# Patient Record
Sex: MALE | Race: BLACK OR AFRICAN AMERICAN | Employment: FULL TIME | URBAN - METROPOLITAN AREA
[De-identification: names, ages, dates, MRNs, and addresses within clinical notes are randomized per-mention and may not be internally consistent; named-entity substitution may affect disease eponyms.]

---

## 2024-10-10 ENCOUNTER — OFFICE VISIT (OUTPATIENT)
Age: 19
End: 2024-10-10

## 2024-10-10 VITALS — HEIGHT: 67 IN | BODY MASS INDEX: 26.68 KG/M2 | WEIGHT: 170 LBS

## 2024-10-10 DIAGNOSIS — M25.512 CHRONIC LEFT SHOULDER PAIN: Primary | ICD-10-CM

## 2024-10-10 DIAGNOSIS — M19.012 ARTHRITIS OF LEFT SHOULDER REGION: ICD-10-CM

## 2024-10-10 DIAGNOSIS — G89.29 CHRONIC LEFT SHOULDER PAIN: Primary | ICD-10-CM

## 2024-10-10 PROCEDURE — 99202 OFFICE O/P NEW SF 15 MIN: CPT | Performed by: ORTHOPAEDIC SURGERY

## 2024-10-10 PROCEDURE — 73030 X-RAY EXAM OF SHOULDER: CPT | Performed by: ORTHOPAEDIC SURGERY

## 2024-10-10 SDOH — HEALTH STABILITY: PHYSICAL HEALTH: ON AVERAGE, HOW MANY DAYS PER WEEK DO YOU ENGAGE IN MODERATE TO STRENUOUS EXERCISE (LIKE A BRISK WALK)?: 0 DAYS

## 2024-10-10 NOTE — PROGRESS NOTES
Joyce Rees  2005   Chief Complaint   Patient presents with    Shoulder Pain     left        HISTORY OF PRESENT ILLNESS  Joyce Rees is a 19 y.o. male who presents today for evaluation of left shoulder pain.  Pain is a 9/10. Pain has been present for a year. He had undergone surgery on his shoulder over a year ago in [place].  Continues to have problems.    Has tried following treatments: Injections:No; Brace:No; Therapy:No; Cane/Crutch:No      No Known Allergies     History reviewed. No pertinent past medical history.   Social History    None        History reviewed. No pertinent surgical history.   History reviewed. No pertinent family history.  No current outpatient medications on file.     No current facility-administered medications for this visit.       REVIEW OF SYSTEM   Patient denies: Weight loss, Fever/Chills, HA, Visual changes, Fatigue, Chest pain, SOB, Abdominal pain, N/V/D/C, Blood in stool or urine, Edema.   Pertinent positive as above in HPI. All others were negative    PHYSICAL EXAM:   Ht 1.702 m (5' 7\")   Wt 77.1 kg (170 lb)   BMI 26.63 kg/m²   The patient is a well-developed, well-nourished male   in no acute distress.  The patient is alert and oriented times three.  The patient is alert and oriented times three. Mood and affect are normal.  LYMPHATIC: lymph nodes are not enlarged and are within normal limits  SKIN: normal in color and non tender to palpation. There are no bruises or abrasions noted.   NEUROLOGICAL: Motor sensory exam is within normal limits. Reflexes are equal bilaterally. There is normal sensation to pinprick and light touch  MUSCULOSKELETAL: Restricted range of motion of the left shoulder         PROCEDURE: none    IMAGING: XR of the left shoulder with 3 views obtained in office on 10/10/2024 reviewed and read by : 2 screws that appear to have eroded into the joint from a previous bone stabilization procedure Laterge

## 2024-11-05 ENCOUNTER — CLINICAL DOCUMENTATION (OUTPATIENT)
Age: 19
End: 2024-11-05

## 2024-11-05 NOTE — PROGRESS NOTES
Referral , insurance card, patient demographics , and last office note faxed to [facility] for scheduling. [facility] P:[phone]   F:[phone].   Patient prefers an office in the [facility] instead of [facility]
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
## 2025-07-01 ENCOUNTER — OFFICE VISIT (OUTPATIENT)
Age: 20
End: 2025-07-01
Payer: MEDICAID

## 2025-07-01 DIAGNOSIS — G89.29 CHRONIC LEFT SHOULDER PAIN: Primary | ICD-10-CM

## 2025-07-01 DIAGNOSIS — M19.012 ARTHRITIS OF LEFT SHOULDER REGION: ICD-10-CM

## 2025-07-01 DIAGNOSIS — M25.512 CHRONIC LEFT SHOULDER PAIN: Primary | ICD-10-CM

## 2025-07-01 PROCEDURE — 99213 OFFICE O/P EST LOW 20 MIN: CPT | Performed by: ORTHOPAEDIC SURGERY

## 2025-07-01 PROCEDURE — 73030 X-RAY EXAM OF SHOULDER: CPT | Performed by: ORTHOPAEDIC SURGERY

## 2025-07-16 ENCOUNTER — TELEPHONE (OUTPATIENT)
Age: 20
End: 2025-07-16

## 2025-07-16 NOTE — TELEPHONE ENCOUNTER
Returned call to Ms. Cabrera and advised of response below, she says [facility] didn't have record of our referral and that there were no physicians at [facility] any longer.  I contacted [facility] orthopedics at [phone] and confirmed the fax number in the referral is correct but they did not have record of our referral.  I was able to get patient registered and attempted to contact MsRigoberto Cabrera again but had to leave generic voicemail.    Please re-fax our referral to [facility] at F [phone]

## 2025-07-16 NOTE — TELEPHONE ENCOUNTER
----- Message from JENAE ZHANG LPN sent at 7/15/2025 11:30 AM EDT -----  Regarding: FW: Specialty Message to Provider    ----- Message -----  From: Elizabeth Antunez ACNP  Sent: 7/15/2025  11:28 AM EDT  To: Breanna Man LPN  Subject: FW: Specialty Message to Provider                Not that I know of, can try [facility] but really needs to go to [facility]  ----- Message -----  From: Breanna Man LPN  Sent: 7/15/2025   9:53 AM EDT  To: JIL Madden  Subject: FW: Specialty Message to Provider                Is there anyone that does this procedure around here.  ----- Message -----  From: Yessenia Rascon  Sent: 7/10/2025  11:31 AM EDT  To: [facility] Clinical Staff  Subject: Specialty Message to Provider                    Specialty Message to Provider    Relationship to Patient: Luigi Rees     Patient Message: patient mom called for .    Mrs. Rees said that the patient was referred by  to [facility].   Mrs. Rees said that [place] them, that she would like to know if their is a closer location .   The patient lives in [place].    --------------------------------------------------------------------------------------------------------------------------    Call Back Information: OK to leave message on voicemail  Preferred Call Back Number: [phone]

## 2025-07-18 DIAGNOSIS — M19.012 ARTHRITIS OF LEFT SHOULDER REGION: Primary | ICD-10-CM

## 2025-07-18 DIAGNOSIS — M25.512 CHRONIC LEFT SHOULDER PAIN: ICD-10-CM

## 2025-07-18 DIAGNOSIS — G89.29 CHRONIC LEFT SHOULDER PAIN: ICD-10-CM
